# Patient Record
(demographics unavailable — no encounter records)

---

## 2025-07-14 NOTE — PHYSICAL EXAM
[Normal Appearance] : normal in appearance [No Masses] : no palpable masses [No Nipple Discharge] : no nipple discharge [No Axillary Lymphadenopathy] : no axillary lymphadenopathy [Normal] : no posterior cervical lymphadenopathy and no anterior cervical lymphadenopathy

## 2025-07-14 NOTE — ASSESSMENT
[Vaccines Reviewed] : Immunizations reviewed today. Please see immunization details in the vaccine log within the immunization flowsheet.  [FreeTextEntry1] : Annual Physical Exam: Health maintenance  - BP is stable. Continue current management. - Check A1c, CBC, CMP, Lipid profile, TSH, Urinalysis, Vitamin levels - Order DEXA Bone Density Axial w/ Vertebral Fracture Assessment, Mammogram Digital Screening (Bilat)  .-Recommended shingles vaccine - RTO annually or as needed.   Pt verbalized understanding and will reach out should any questions/concerns occur.

## 2025-07-14 NOTE — ADDENDUM
[FreeTextEntry1] :  I, Ally Sanford, acted as a scribe on behalf of Dr. Nile Sorenson MD, on 07/11/2025.  All medical entries made by the scribe were at my, Dr. Nile Sorenson MD, direction and personally dictated by me on 07/11/2025. I have reviewed the chart and agree that the record accurately reflects my personal performance of the history, physical exam, assessment and plan. I have also personally directed, reviewed, and agreed with the chart.

## 2025-07-14 NOTE — HEALTH RISK ASSESSMENT
[Good] : ~his/her~  mood as  good [No] : In the past 12 months have you used drugs other than those required for medical reasons? No [PHQ-2 Negative - No further assessment needed] : PHQ-2 Negative - No further assessment needed [Time Spent: ___ Minutes] : I spent [unfilled] minutes performing a depression screening for this patient. [Patient reported mammogram was normal] : Patient reported mammogram was normal [Never] : Never [NO] : No [FreeTextEntry1] : health maintenance  [0] : 2) Feeling down, depressed, or hopeless: Not at all (0) [[id number]] : 0 [MammogramDate] : 08/21 [MammogramComments] : BIRADS 1 - Negative  [PapSmearComments] :  n/a  [BoneDensityComments] :  n/a  [ColonoscopyDate] : 12/23

## 2025-07-14 NOTE — HISTORY OF PRESENT ILLNESS
[FreeTextEntry1] : Patient is present today to establish care and for a comprehensive Annual Physical Exam.  [de-identified] : Patient is a 62yr old F who is present today to establish care and for a comprehensive Annual Physical Exam.  Patient is doing well overall. Hx of thalassemia, no complications. Also reports lost voice from cold that is improving with time. Provider recommends limit singing as to not strain vocal cords. Pt c/o of pain in right thumb. Confirms frequent cooking and computer usage in the past. Currently taking Turmeric, Vitamin C, B12 and B3. Not UTD with GYN visit, due for Mammogram. Provider also recommends completing Bone Density. UTD with Colonoscopy. Started menopause at 55yrs old. Not UTD with [facility] visit. Denies brief tobacco usage over 50yrs ago. UTD with Shingles vaccines. Reports weight gain and is attempting to manage with exercise. PSHx of  during delivery. Consented to breast examination.    Denies any CP, chest tightness or SOB. Denies any abdominal pain, urinary symptom, or change in bowel habits. Denies any fever, chills, or night sweats.

## 2025-07-14 NOTE — HISTORY OF PRESENT ILLNESS
[FreeTextEntry1] : Patient is present today to establish care and for a comprehensive Annual Physical Exam.  [de-identified] : Patient is a 62yr old F who is present today to establish care and for a comprehensive Annual Physical Exam.  Patient is doing well overall. Hx of thalassemia, no complications. Also reports lost voice from cold that is improving with time. Provider recommends limit singing as to not strain vocal cords. Pt c/o of pain in right thumb. Confirms frequent cooking and computer usage in the past. Currently taking Turmeric, Vitamin C, B12 and B3. Not UTD with GYN visit, due for Mammogram. Provider also recommends completing Bone Density. UTD with Colonoscopy. Started menopause at 55yrs old. Not UTD with [facility] visit. Denies brief tobacco usage over 50yrs ago. UTD with Shingles vaccines. Reports weight gain and is attempting to manage with exercise. PSHx of  during delivery. Consented to breast examination.    Denies any CP, chest tightness or SOB. Denies any abdominal pain, urinary symptom, or change in bowel habits. Denies any fever, chills, or night sweats.

## 2025-07-14 NOTE — HEALTH RISK ASSESSMENT
[Good] : ~his/her~  mood as  good [No] : In the past 12 months have you used drugs other than those required for medical reasons? No [PHQ-2 Negative - No further assessment needed] : PHQ-2 Negative - No further assessment needed [Time Spent: ___ Minutes] : I spent [unfilled] minutes performing a depression screening for this patient. [Patient reported mammogram was normal] : Patient reported mammogram was normal [Never] : Never [NO] : No [FreeTextEntry1] : health maintenance  [0] : 2) Feeling down, depressed, or hopeless: Not at all (0) [[id number]] : 0 [MammogramDate] : 08/21 [MammogramComments] : BIRADS 1 - Negative  [PapSmearComments] :  n/a  [BoneDensityComments] :  n/a  [ColonoscopyDate] : 12/23